# Patient Record
Sex: MALE | URBAN - METROPOLITAN AREA
[De-identification: names, ages, dates, MRNs, and addresses within clinical notes are randomized per-mention and may not be internally consistent; named-entity substitution may affect disease eponyms.]

---

## 2019-01-10 ENCOUNTER — APPOINTMENT (RX ONLY)
Dept: URBAN - METROPOLITAN AREA CLINIC 139 | Facility: CLINIC | Age: 69
Setting detail: DERMATOLOGY
End: 2019-01-10

## 2019-01-10 DIAGNOSIS — L03.01 CELLULITIS OF FINGER: ICD-10-CM

## 2019-01-10 PROBLEM — L03.011 CELLULITIS OF RIGHT FINGER: Status: ACTIVE | Noted: 2019-01-10

## 2019-01-10 PROBLEM — M12.9 ARTHROPATHY, UNSPECIFIED: Status: ACTIVE | Noted: 2019-01-10

## 2019-01-10 PROBLEM — L03.012 CELLULITIS OF LEFT FINGER: Status: ACTIVE | Noted: 2019-01-10

## 2019-01-10 PROCEDURE — ? ORDER TESTS

## 2019-01-10 PROCEDURE — ? COUNSELING

## 2019-01-10 PROCEDURE — ? OTHER

## 2019-01-10 PROCEDURE — 99202 OFFICE O/P NEW SF 15 MIN: CPT

## 2019-01-10 ASSESSMENT — LOCATION SIMPLE DESCRIPTION DERM
LOCATION SIMPLE: LEFT SMALL FINGER
LOCATION SIMPLE: RIGHT MIDDLE FINGERNAIL
LOCATION SIMPLE: LEFT RING FINGER
LOCATION SIMPLE: LEFT INDEX FINGERNAIL
LOCATION SIMPLE: RIGHT RING FINGERNAIL
LOCATION SIMPLE: RIGHT INDEX FINGERNAIL
LOCATION SIMPLE: LEFT MIDDLE FINGER
LOCATION SIMPLE: RIGHT SMALL FINGER
LOCATION SIMPLE: RIGHT THUMB

## 2019-01-10 ASSESSMENT — LOCATION DETAILED DESCRIPTION DERM
LOCATION DETAILED: PERIUNGUAL SKIN LEFT MIDDLE FINGER
LOCATION DETAILED: RIGHT INDEX FINGER LUNULA
LOCATION DETAILED: LEFT INDEX FINGER LUNULA
LOCATION DETAILED: LEFT SMALL FINGERNAIL
LOCATION DETAILED: RIGHT RING FINGER LUNULA
LOCATION DETAILED: RIGHT SMALL FINGERNAIL
LOCATION DETAILED: RIGHT MIDDLE FINGER LUNULA
LOCATION DETAILED: LEFT RING FINGERNAIL
LOCATION DETAILED: PERIUNGUAL SKIN RIGHT THUMB

## 2019-01-10 ASSESSMENT — LOCATION ZONE DERM
LOCATION ZONE: FINGER
LOCATION ZONE: FINGERNAIL

## 2019-01-10 NOTE — PROCEDURE: OTHER
Detail Level: Simple
Note Text (......Xxx Chief Complaint.): This diagnosis correlates with the
Other (Free Text): Patient will bring in the right thumbnail when it falls off as it is close to falling off.  He was given a specimen cup and we will transfer it to biopsy cup to send to lab.  It was discussed that Dr. Nash will do some research on his condition to find the correct treatment course and we will be in contact with him once a course of action is figured out.  Patient is in agreement with this plan.

## 2019-01-10 NOTE — HPI: NAIL DYSTROPHY
How Severe Is It?: moderate
Is This A New Presentation, Or A Follow-Up?: Nail Dystrophy
Additional History: Patient states that he noticed a few months ago his nails stopped growing and became shorter.  Then a few weeks later he noticed a green color under nails and a darker pigmented colors around the cuticles.  He started having tenderness after a month or two around the cuticle and they became red and slightly irritated.  He saw his PCP and they prescribed the Ciclopirox, which he states is not really helping.  He has tried OTC anti fungal creams, but due to current medications and rheumatoid arthritis he will not take any oral anti fungals.  He does have noticeable swelling of the finger joints with redness due to his rheumatoid arthritis.

## 2019-01-10 NOTE — PROCEDURE: COUNSELING
Detail Level: Detailed
Patient Specific Counseling (Will Not Stick From Patient To Patient): Patient advised that chronic paronychia can be caused by bacteria, yeast and even fungi or combination of these.  Culture was performed to hopefully better define causative agent(s).  Patient to use dilute bleach solution or hydrogen peroxide under nails and then apply antibiotic ointment to nail folds and under nails as tolerated.  Will give more specific medications with culture results.

## 2019-01-16 ENCOUNTER — RX ONLY (OUTPATIENT)
Age: 69
Setting detail: RX ONLY
End: 2019-01-16

## 2019-01-16 RX ORDER — CLOTRIMAZOLE/BETAMETHASONE DIP 1 %-0.05 %
CREAM (GRAM) TOPICAL
Qty: 1 | Refills: 0 | Status: ERX

## 2019-01-16 RX ORDER — DOXYCYCLINE HYCLATE 100 MG/1
CAPSULE, GELATIN COATED ORAL
Qty: 60 | Refills: 0 | Status: ERX

## 2019-02-05 ENCOUNTER — APPOINTMENT (RX ONLY)
Dept: URBAN - METROPOLITAN AREA CLINIC 139 | Facility: CLINIC | Age: 69
Setting detail: DERMATOLOGY
End: 2019-02-05

## 2019-02-05 DIAGNOSIS — L03.01 CELLULITIS OF FINGER: ICD-10-CM

## 2019-02-05 PROBLEM — L08.9 LOCAL INFECTION OF THE SKIN AND SUBCUTANEOUS TISSUE, UNSPECIFIED: Status: ACTIVE | Noted: 2019-02-05

## 2019-02-05 PROCEDURE — ? NAIL CLIPPING FOR PAS

## 2019-02-05 ASSESSMENT — LOCATION SIMPLE DESCRIPTION DERM: LOCATION SIMPLE: RIGHT THUMB

## 2019-02-05 ASSESSMENT — LOCATION DETAILED DESCRIPTION DERM: LOCATION DETAILED: PERIUNGUAL SKIN RIGHT THUMB

## 2019-02-05 ASSESSMENT — LOCATION ZONE DERM: LOCATION ZONE: FINGER

## 2019-02-05 NOTE — PROCEDURE: NAIL CLIPPING FOR PAS
Billing Type: Third-Party Bill
Add 15961 To Bill?: No
Detail Level: Detailed
Lab Facility: [id number]
Lab: [id number]

## 2019-02-20 ENCOUNTER — APPOINTMENT (RX ONLY)
Dept: URBAN - METROPOLITAN AREA CLINIC 139 | Facility: CLINIC | Age: 69
Setting detail: DERMATOLOGY
End: 2019-02-20

## 2019-02-20 DIAGNOSIS — L03.01 CELLULITIS OF FINGER: ICD-10-CM

## 2019-02-20 PROBLEM — L08.9 LOCAL INFECTION OF THE SKIN AND SUBCUTANEOUS TISSUE, UNSPECIFIED: Status: ACTIVE | Noted: 2019-02-20

## 2019-02-20 PROCEDURE — ? ORDER TESTS

## 2019-02-20 PROCEDURE — ? NAIL AVULSION

## 2019-02-20 PROCEDURE — 11730 AVULSION NAIL PLATE SIMPLE 1: CPT

## 2019-02-20 PROCEDURE — ? COUNSELING

## 2019-02-20 ASSESSMENT — LOCATION DETAILED DESCRIPTION DERM: LOCATION DETAILED: RIGHT SMALL FINGERNAIL

## 2019-02-20 ASSESSMENT — LOCATION ZONE DERM: LOCATION ZONE: FINGERNAIL

## 2019-02-20 ASSESSMENT — LOCATION SIMPLE DESCRIPTION DERM: LOCATION SIMPLE: RIGHT SMALL FINGER

## 2019-02-20 NOTE — PROCEDURE: NAIL AVULSION
Bill For Surgical Tray: no
Partial Avulsion Text: The nail was partially removed by undermining, removing the nail from the nail bed. The nail was subsequently avulsed.  Hemostasis was obtained with electrocautery.
Lab: [id number]
Avulsion Type: nail avulsion
Lab Facility: [id number]
Surgical Prep Text: The patient was placed in the supine position on the operating table in the surgery suite. The area was prepared and draped in the standard manner. Digital block(s) were obtained with a 50/50 mix of 2% lidocaine epinephrine and Marcaine. A total of 2.5 cc was used.
Consent: The rationale for nail avulsion was explained to the patient and consent was obtained. The risks, benefits and alternatives to therapy were discussed in detail. Specifically, the risks of infection, scarring, bleeding, prolonged wound healing, incomplete removal, allergy to anesthesia, nerve injury and recurrence were addressed. Prior to the procedure, the treatment site was clearly identified and confirmed by the patient. All components of Universal Protocol/PAUSE Rule completed.
Nail Avulsion Text: The nail was removed by undermining, removing the nail from the nail bed. The nail was subsequently avulsed.  There was no present bleeding.  The wound was then covered with Vaseline and a Telfa pad.  Then a pressure bandage was applied. Patient instructed to keep the bandage intact and dry for 1-2 days and after that he is to clean the area and reapply a bandage until it is somewhat healed or less tender.  Patient instructed to take Tylenol or Motrin as needed for pain and to elevate the wound as needed for pain.  He was given instructions to call our office if any problems arise.
Billing Type: Third-Party Bill
Nail And Matrix Avulsion Text: The nail was removed by undermining, removing the nail from the nail bed. The nail was subsequently avulsed. There was no bleeding present.
Detail Level: Detailed

## 2019-02-26 ENCOUNTER — RX ONLY (OUTPATIENT)
Age: 69
Setting detail: RX ONLY
End: 2019-02-26

## 2019-02-26 RX ORDER — TERBINAFINE HYDROCHLORIDE 250 MG/1
1 TABLET ORAL QD
Qty: 30 | Refills: 2 | Status: ERX

## 2019-03-11 ENCOUNTER — RX ONLY (OUTPATIENT)
Age: 69
Setting detail: RX ONLY
End: 2019-03-11

## 2019-03-11 RX ORDER — DOXYCYCLINE HYCLATE 100 MG/1
CAPSULE, GELATIN COATED ORAL
Qty: 30 | Refills: 0 | Status: ERX

## 2019-03-26 ENCOUNTER — APPOINTMENT (RX ONLY)
Dept: URBAN - METROPOLITAN AREA CLINIC 139 | Facility: CLINIC | Age: 69
Setting detail: DERMATOLOGY
End: 2019-03-26

## 2019-03-26 DIAGNOSIS — L60.3 NAIL DYSTROPHY: ICD-10-CM

## 2019-03-26 DIAGNOSIS — L03.01 CELLULITIS OF FINGER: ICD-10-CM

## 2019-03-26 DIAGNOSIS — B35.1 TINEA UNGUIUM: ICD-10-CM | Status: WORSENING

## 2019-03-26 PROBLEM — L03.019 CELLULITIS OF UNSPECIFIED FINGER: Status: ACTIVE | Noted: 2019-03-26

## 2019-03-26 PROCEDURE — ? TREATMENT REGIMEN

## 2019-03-26 PROCEDURE — ? COUNSELING

## 2019-03-26 PROCEDURE — ? OTHER

## 2019-03-26 PROCEDURE — 99213 OFFICE O/P EST LOW 20 MIN: CPT

## 2019-03-26 ASSESSMENT — LOCATION SIMPLE DESCRIPTION DERM
LOCATION SIMPLE: RIGHT MIDDLE FINGER
LOCATION SIMPLE: LEFT MIDDLE FINGER

## 2019-03-26 ASSESSMENT — LOCATION DETAILED DESCRIPTION DERM
LOCATION DETAILED: LEFT MIDDLE FINGERNAIL
LOCATION DETAILED: RIGHT MIDDLE FINGERNAIL

## 2019-03-26 ASSESSMENT — LOCATION ZONE DERM: LOCATION ZONE: FINGERNAIL

## 2019-03-26 NOTE — PROCEDURE: OTHER
Other (Free Text): Reasons were discussed with patient has to what can cause the issues with his nails such as chemicals (copper sulfate, Derifil and sodium propionate) or psoriasis.  Bacteria and fungus is also a possible option.  \\n Patient also advised that inflammation underlying the DIP Joint could possibly cause enough inflammation of the overlying proximal nail folds to allow the cuticle to become incompetent and allow penetration of both fungi and bacteria under the folds with subsequent discoloration, as well as affect the matrix just due to the inflammation and cause a dystrophic nail plate.  Patient is awaiting results from the arthritis work up he had done at [facility] last week.
Note Text (......Xxx Chief Complaint.): This diagnosis correlates with the
Detail Level: Simple

## 2019-03-26 NOTE — PROCEDURE: TREATMENT REGIMEN
Initiate Treatment: Bleach soaks to help kill bacteria and fungi. Use one teaspoon bleach per quart of water.  Soak twice a day for 5-10 minutes.  Dilute further if irritation.
Plan: Patient is to follow up with his work up for arthritis and inform our office once he receives a diagnosis.
Continue Regimen: Doxycycline 100 mg BID, Terbinafine 250mg, and  Lotrisone cream
Detail Level: Zone

## 2019-03-26 NOTE — PROCEDURE: COUNSELING
Detail Level: Zone
Detail Level: Detailed
Patient Specific Counseling (Will Not Stick From Patient To Patient): Patient also advised that the changes in the nail could actually be secondary to Psoriasis or Inflammation of the Nail folds and Matrix due to underlying arthritis.  Arthritis work-up by Rheumatology is in progress.

## 2019-05-21 ENCOUNTER — APPOINTMENT (RX ONLY)
Dept: URBAN - METROPOLITAN AREA CLINIC 139 | Facility: CLINIC | Age: 69
Setting detail: DERMATOLOGY
End: 2019-05-21

## 2019-05-21 DIAGNOSIS — B35.1 TINEA UNGUIUM: ICD-10-CM

## 2019-05-21 DIAGNOSIS — L60.3 NAIL DYSTROPHY: ICD-10-CM

## 2019-05-21 DIAGNOSIS — L03.01 CELLULITIS OF FINGER: ICD-10-CM

## 2019-05-21 PROBLEM — L08.9 LOCAL INFECTION OF THE SKIN AND SUBCUTANEOUS TISSUE, UNSPECIFIED: Status: ACTIVE | Noted: 2019-05-21

## 2019-05-21 PROCEDURE — ? TREATMENT REGIMEN

## 2019-05-21 PROCEDURE — ? COUNSELING

## 2019-05-21 PROCEDURE — 99213 OFFICE O/P EST LOW 20 MIN: CPT

## 2019-05-21 ASSESSMENT — LOCATION DETAILED DESCRIPTION DERM
LOCATION DETAILED: PERIUNGUAL SKIN RIGHT MIDDLE FINGER
LOCATION DETAILED: PERIUNGUAL SKIN RIGHT RING FINGER
LOCATION DETAILED: PERIUNGUAL SKIN RIGHT INDEX FINGER
LOCATION DETAILED: PERIUNGUAL SKIN LEFT INDEX FINGER
LOCATION DETAILED: PERIUNGUAL SKIN LEFT RING FINGER
LOCATION DETAILED: PERIUNGUAL SKIN LEFT SMALL FINGER

## 2019-05-21 ASSESSMENT — LOCATION ZONE DERM: LOCATION ZONE: FINGER

## 2019-05-21 ASSESSMENT — LOCATION SIMPLE DESCRIPTION DERM
LOCATION SIMPLE: LEFT SMALL FINGER
LOCATION SIMPLE: LEFT INDEX FINGER
LOCATION SIMPLE: RIGHT INDEX FINGER
LOCATION SIMPLE: LEFT RING FINGER
LOCATION SIMPLE: RIGHT RING FINGER
LOCATION SIMPLE: RIGHT MIDDLE FINGER

## 2019-05-21 NOTE — PROCEDURE: TREATMENT REGIMEN
Plan: Will have pt fill out JOSÉ to obtained lab results from [facility].  Patient was prescribed oral Terbinafine but was advised not to take it due to interaction with his DMARD medication prescribed by his Rheumatologist.  He is supposed to be using topicals for now but compliance is doubtful.
Detail Level: Zone

## 2019-05-21 NOTE — PROCEDURE: COUNSELING
Detail Level: Detailed
Patient Specific Counseling (Will Not Stick From Patient To Patient): Patient also advised that the changes in the nail could actually be secondary to Psoriasis or Inflammation of the Nail folds and Matrix due to underlying arthritis.  Arthritis work-up by Rheumatology is in progress.  I will call his Rheumatologist and discuss my findings and suggest discontinuation of the oral DMARD he is presently taking and recommend changing to Humira for both RA and Psoriasis and then institute oral Lamisil as indicated for his nails.
Detail Level: Simple

## 2019-06-25 ENCOUNTER — APPOINTMENT (RX ONLY)
Dept: URBAN - METROPOLITAN AREA CLINIC 139 | Facility: CLINIC | Age: 69
Setting detail: DERMATOLOGY
End: 2019-06-25

## 2019-06-25 DIAGNOSIS — B35.1 TINEA UNGUIUM: ICD-10-CM | Status: UNCHANGED

## 2019-06-25 DIAGNOSIS — F17.200 NICOTINE DEPENDENCE, UNSPECIFIED, UNCOMPLICATED: ICD-10-CM

## 2019-06-25 DIAGNOSIS — L03.01 CELLULITIS OF FINGER: ICD-10-CM

## 2019-06-25 DIAGNOSIS — L60.3 NAIL DYSTROPHY: ICD-10-CM | Status: UNCHANGED

## 2019-06-25 PROBLEM — L03.019 CELLULITIS OF UNSPECIFIED FINGER: Status: ACTIVE | Noted: 2019-06-25

## 2019-06-25 PROCEDURE — ? TREATMENT REGIMEN

## 2019-06-25 PROCEDURE — ? ORDER TESTS

## 2019-06-25 PROCEDURE — 99213 OFFICE O/P EST LOW 20 MIN: CPT

## 2019-06-25 PROCEDURE — ? COUNSELING

## 2019-06-25 ASSESSMENT — LOCATION ZONE DERM: LOCATION ZONE: FINGERNAIL

## 2019-06-25 ASSESSMENT — LOCATION DETAILED DESCRIPTION DERM
LOCATION DETAILED: RIGHT MIDDLE FINGERNAIL
LOCATION DETAILED: LEFT MIDDLE FINGERNAIL

## 2019-06-25 ASSESSMENT — LOCATION SIMPLE DESCRIPTION DERM
LOCATION SIMPLE: RIGHT MIDDLE FINGER
LOCATION SIMPLE: LEFT MIDDLE FINGER

## 2019-06-25 NOTE — PROCEDURE: COUNSELING
Detail Level: Simple
Detail Level: Detailed
Patient Specific Counseling (Will Not Stick From Patient To Patient): Paronychia involving all finger nails, thought to be due to underlying inflammation of the joints with Reactive Arthritis vs Nail Psoriasis and Psoriatic arthritis.  Patient medication changes just began one week ago, so no significant changes noticed at this time.  The previous pathology of a nail clipping showing Onychomycosis may represent contamination, since the involvement is proximal, rather than distal.  Will repeat clippings prior to institution of Terbinafine.  Will follow up with patient in 3-4 months, and if still no improvement, will consider oral antifungal.  Patient voices understanding and agrees with plan.
Quality 226: Preventive Care And Screening: Tobacco Use: Screening And Cessation Intervention: Patient screened for tobacco and is a smoker AND received Cessation Counseling

## 2019-06-25 NOTE — PROCEDURE: ORDER TESTS
Bill For Surgical Tray: no
Performing Laboratory: [id number]
Billing Type: Third-Party Bill
Expected Date Of Service: 06/25/2019

## 2019-06-25 NOTE — PROCEDURE: MIPS QUALITY
Quality 111:Pneumonia Vaccination Status For Older Adults: Pneumococcal Vaccination Previously Received
Quality 131: Pain Assessment And Follow-Up: Pain assessment using a standardized tool is documented as negative, no follow-up plan required
Detail Level: Detailed
Quality 130: Documentation Of Current Medications In The Medical Record: Current Medications Documented
Quality 110: Preventive Care And Screening: Influenza Immunization: Influenza Immunization Administered during Influenza season
Quality 474: Zoster Vaccination Status: Shingrix Vaccination Administered or Previously Received

## 2019-07-02 ENCOUNTER — RX ONLY (OUTPATIENT)
Age: 69
Setting detail: RX ONLY
End: 2019-07-02

## 2019-07-02 RX ORDER — DOXYCYCLINE HYCLATE 100 MG/1
CAPSULE, GELATIN COATED ORAL
Qty: 28 | Refills: 0 | Status: ERX

## 2019-09-25 ENCOUNTER — APPOINTMENT (RX ONLY)
Dept: URBAN - METROPOLITAN AREA CLINIC 139 | Facility: CLINIC | Age: 69
Setting detail: DERMATOLOGY
End: 2019-09-25

## 2019-09-25 DIAGNOSIS — L03.01 CELLULITIS OF FINGER: ICD-10-CM | Status: WORSENING

## 2019-09-25 DIAGNOSIS — F17.200 NICOTINE DEPENDENCE, UNSPECIFIED, UNCOMPLICATED: ICD-10-CM

## 2019-09-25 DIAGNOSIS — L60.3 NAIL DYSTROPHY: ICD-10-CM | Status: WORSENING

## 2019-09-25 DIAGNOSIS — B35.1 TINEA UNGUIUM: ICD-10-CM | Status: WORSENING

## 2019-09-25 PROBLEM — L08.9 LOCAL INFECTION OF THE SKIN AND SUBCUTANEOUS TISSUE, UNSPECIFIED: Status: ACTIVE | Noted: 2019-09-25

## 2019-09-25 PROBLEM — L60.9 NAIL DISORDER, UNSPECIFIED: Status: ACTIVE | Noted: 2019-09-25

## 2019-09-25 PROCEDURE — 99213 OFFICE O/P EST LOW 20 MIN: CPT

## 2019-09-25 PROCEDURE — ? COUNSELING

## 2019-09-25 ASSESSMENT — LOCATION ZONE DERM: LOCATION ZONE: FINGERNAIL

## 2019-09-25 ASSESSMENT — LOCATION SIMPLE DESCRIPTION DERM
LOCATION SIMPLE: RIGHT MIDDLE FINGER
LOCATION SIMPLE: LEFT MIDDLE FINGER

## 2019-09-25 ASSESSMENT — LOCATION DETAILED DESCRIPTION DERM
LOCATION DETAILED: LEFT MIDDLE FINGERNAIL
LOCATION DETAILED: RIGHT MIDDLE FINGERNAIL

## 2019-09-25 NOTE — PROCEDURE: COUNSELING
Patient Specific Counseling (Will Not Stick From Patient To Patient): Paronychia involving all finger nails, thought to be due to underlying inflammation of the joints with Reactive Arthritis vs Nail Psoriasis and Psoriatic arthritis.  Patient medication changes just began one week ago, so no significant changes noticed at this time.  The previous pathology of a nail clipping showing Onychomycosis may represent contamination, since the involvement is proximal, rather than distal.  Will repeat clippings prior to institution of Terbinafine.  Will follow up with patient in 3-4 months, and if still no improvement, will consider oral antifungal, although caution due to patient taking Arava, DMARD, which may reduce Terbinafine levels due to increased hepatic metabolism.  Patient voices understanding and agrees with plan.\\n\\nI have requested notes from his latest visit with Dr. Maya, Rheumatologist at [facility].  After review, I will call her and ask her to consider possibly changes from a TNF inhibitor to maybe Stelara or Cosentyx, both of which are approved for RA and PsA and Psoriasis.
Detail Level: Detailed
Detail Level: Simple
Quality 226: Preventive Care And Screening: Tobacco Use: Screening And Cessation Intervention: Patient screened for tobacco and is a smoker AND received Cessation Counseling

## 2019-09-25 NOTE — PROCEDURE: MIPS QUALITY
Quality 474: Zoster Vaccination Status: Shingrix Vaccination Administered or Previously Received
Quality 111:Pneumonia Vaccination Status For Older Adults: Pneumococcal Vaccination Previously Received
Quality 110: Preventive Care And Screening: Influenza Immunization: Influenza Immunization Administered during Influenza season
Quality 131: Pain Assessment And Follow-Up: Pain assessment using a standardized tool is documented as negative, no follow-up plan required
Quality 130: Documentation Of Current Medications In The Medical Record: Current Medications Documented
Detail Level: Detailed

## 2019-12-18 ENCOUNTER — APPOINTMENT (RX ONLY)
Dept: URBAN - METROPOLITAN AREA CLINIC 139 | Facility: CLINIC | Age: 69
Setting detail: DERMATOLOGY
End: 2019-12-18

## 2019-12-18 DIAGNOSIS — F17.200 NICOTINE DEPENDENCE, UNSPECIFIED, UNCOMPLICATED: ICD-10-CM

## 2019-12-18 DIAGNOSIS — L60.3 NAIL DYSTROPHY: ICD-10-CM | Status: WORSENING

## 2019-12-18 PROBLEM — L60.9 NAIL DISORDER, UNSPECIFIED: Status: ACTIVE | Noted: 2019-12-18

## 2019-12-18 PROCEDURE — ? OTHER

## 2019-12-18 PROCEDURE — 99213 OFFICE O/P EST LOW 20 MIN: CPT

## 2019-12-18 PROCEDURE — ? COUNSELING

## 2019-12-18 ASSESSMENT — LOCATION DETAILED DESCRIPTION DERM
LOCATION DETAILED: LEFT RING FINGERNAIL
LOCATION DETAILED: RIGHT MIDDLE FINGERNAIL
LOCATION DETAILED: LEFT THUMBNAIL
LOCATION DETAILED: LEFT SMALL FINGERNAIL
LOCATION DETAILED: RIGHT SMALL FINGERNAIL
LOCATION DETAILED: RIGHT RING FINGERNAIL
LOCATION DETAILED: RIGHT INDEX FINGERNAIL
LOCATION DETAILED: LEFT INDEX FINGERNAIL
LOCATION DETAILED: LEFT MIDDLE FINGERNAIL
LOCATION DETAILED: RIGHT THUMBNAIL

## 2019-12-18 ASSESSMENT — LOCATION SIMPLE DESCRIPTION DERM
LOCATION SIMPLE: RIGHT RING FINGER
LOCATION SIMPLE: LEFT THUMB
LOCATION SIMPLE: RIGHT INDEX FINGER
LOCATION SIMPLE: LEFT INDEX FINGER
LOCATION SIMPLE: LEFT SMALL FINGER
LOCATION SIMPLE: RIGHT SMALL FINGER
LOCATION SIMPLE: LEFT RING FINGER
LOCATION SIMPLE: LEFT MIDDLE FINGER
LOCATION SIMPLE: RIGHT MIDDLE FINGER
LOCATION SIMPLE: RIGHT HAND

## 2019-12-18 ASSESSMENT — LOCATION ZONE DERM: LOCATION ZONE: FINGERNAIL

## 2019-12-18 NOTE — PROCEDURE: OTHER
Other (Free Text): Pt has an appointment in April at [facility] to obtain a second opinion on ongoing nail condition
Detail Level: Simple
Note Text (......Xxx Chief Complaint.): This diagnosis correlates with the

## 2019-12-18 NOTE — PROCEDURE: MIPS QUALITY
Quality 110: Preventive Care And Screening: Influenza Immunization: Influenza Immunization Administered during Influenza season
Quality 474: Zoster Vaccination Status: Shingrix Vaccination Administered or Previously Received
Detail Level: Detailed
Quality 111:Pneumonia Vaccination Status For Older Adults: Pneumococcal Vaccination Administered
Quality 131: Pain Assessment And Follow-Up: Pain assessment using a standardized tool is documented as negative, no follow-up plan required

## 2019-12-18 NOTE — PROCEDURE: COUNSELING
Quality 226: Preventive Care And Screening: Tobacco Use: Screening And Cessation Intervention: Patient screened for tobacco and is a smoker AND received Cessation Counseling
Detail Level: Detailed
Patient Specific Counseling (Will Not Stick From Patient To Patient): Things we know:\\n\\n1.  Patient has severe nail dystrophy and chronic paronychia m/b erythema, edema, and tenderness without exudate of all digits of the hands with associated swelling and tenderness of the left wrist, and several MCP and PIP joints on exam with duration greater than 1 year by history.\\n2.  Culture from the paronychium and right thumb nail on 1/10/19 showed abundant mixed microbial population, no further workup.\\n3.  Culture from right 5th nail plate on 2/20/19 showed Fusarium species, a filamentous fungi, and two gram negative bacteria, Klebsiella oxytoca and Serratia marcescens.  Both sensitive to, and treated with, Doxycyline twice with no improvement.  \\n4.  Histopathology from nail avulsion of the right 5th digit on 2/23/19 showed hyphal elements within the cornified layer on PAS with diagnosis of Onychomycosis.  Microscopic description:  There are fragments of nail and/or nailbed stratum corneum.  Within these fragments fungal cells and/or hyphae are seen.\\n5.  Histopathology of the periungual skin and nail plate of the right thumb on 2/7/19 showed no fungal organisms on PAS with diagnosis of Unremarkable Nail Plate.  \\n6.  Radiographs of the right hand x 3 on 8/10/18 with impression:  Progressive erosive arthropathy of the right hand which as previously discussed remains most consistent with inflammatory arthropathy such as seronegative rheumatoid arthritis or seronegative spondyloarthropathy.\\n7.  Ultrasound of the left hand on 3/19/19 showed 1. synovial hypertrophy involving the proximal MCPs and left distal ulna associated with erosions and hyperemia.  These findings support rheumatoid arthritis.  2. No evidence of double contour sign or intraarticular calcifications to suggest coexisting crystalline arthropathy.\\n8.  History of bad dentition, presently in the process of correction and recently acknowledged to me a history of chronic diarrhea with weight loss, also presently being worked up but I have no information of results.  \\n\\nQuestions:\\n\\n1.   Is his condition a Reactive Arthritis/Nereida's which is a seronegative spondyloarthropathy often due to a previous, or possibly an on going, infection secondary to his dental status or chronic bowel condition with history of chronic diarrhea or Psoriatic arthritis, also a seronegative spondyloarthropathy both of which can cause similar nail changes involving the paronychia?  Also included would be an Enteropathic arthritis, an extraintestinal manifestation of IBD.  Although seronegative RA is certainly in the differential, but neither of these would not explain the nail changes.\\n\\n2. Was the positive PAS in the nail histology an actual infection of the nail with a dermatophyte or invasion by a saprophytic fungus, Fusarium sp., that was cultured from one of his nails?\\n\\n3.  Why has he not responded to treatment with DMARD Leflunomide and Cimzia, Certolizumab pegol, a TNF Inhibitor.  RA, PsA, and Psoriasis should all have some response to treatment as well as IBD?  His arthritis is somewhat better on Leflunomide, but his nail changes are unchanged or worsened.  Could this be due to either chronic immune system stimulation due to chronic infection(s) as stated above or truly a coincidental concomitant onychomycosis.\\n\\nThings to do:\\n\\n1.  Follow up on dental status and GI findings; including cultures, exams, procedures and studies.\\n2.  Consult with Dermatopathologist reference nail histology and possibly submit another sample.\\n3.  Consult with several other dermatologist and dermatopathologist within my organization.  And attempt to expedite second opinion by [facility] to allow for better flow of information within the same system.  \\n4.  Since this visit was just prior to the Christmas break, will have patient return for a more thorough ROS and Physical exam with possible collection of specimens for culture and histopathology, as well as, to discuss above findings.   \\n

## 2020-01-22 ENCOUNTER — APPOINTMENT (RX ONLY)
Dept: URBAN - METROPOLITAN AREA CLINIC 139 | Facility: CLINIC | Age: 70
Setting detail: DERMATOLOGY
End: 2020-01-22

## 2020-01-22 DIAGNOSIS — L30.9 DERMATITIS, UNSPECIFIED: ICD-10-CM

## 2020-01-22 DIAGNOSIS — L60.9 NAIL DISORDER, UNSPECIFIED: ICD-10-CM | Status: WORSENING

## 2020-01-22 DIAGNOSIS — L30.4 ERYTHEMA INTERTRIGO: ICD-10-CM

## 2020-01-22 DIAGNOSIS — F17.200 NICOTINE DEPENDENCE, UNSPECIFIED, UNCOMPLICATED: ICD-10-CM

## 2020-01-22 PROCEDURE — ? PRESCRIPTION

## 2020-01-22 PROCEDURE — ? COUNSELING

## 2020-01-22 PROCEDURE — 99213 OFFICE O/P EST LOW 20 MIN: CPT

## 2020-01-22 PROCEDURE — ? TREATMENT REGIMEN

## 2020-01-22 PROCEDURE — ? OTHER

## 2020-01-22 RX ORDER — UREA 40 G/100G
1 LOTION TOPICAL QHS
Qty: 1 | Refills: 2 | Status: ERX | COMMUNITY
Start: 2020-01-22

## 2020-01-22 RX ADMIN — UREA 1: 40 LOTION TOPICAL at 00:00

## 2020-01-22 ASSESSMENT — LOCATION SIMPLE DESCRIPTION DERM
LOCATION SIMPLE: RIGHT UPPER BACK
LOCATION SIMPLE: CHEST
LOCATION SIMPLE: LEFT 4TH TOE
LOCATION SIMPLE: RIGHT 4TH TOE

## 2020-01-22 ASSESSMENT — LOCATION DETAILED DESCRIPTION DERM
LOCATION DETAILED: LEFT MEDIAL 4TH TOE
LOCATION DETAILED: STERNUM
LOCATION DETAILED: RIGHT MEDIAL UPPER BACK
LOCATION DETAILED: RIGHT DORSAL 4TH TOE

## 2020-01-22 ASSESSMENT — LOCATION ZONE DERM
LOCATION ZONE: TRUNK
LOCATION ZONE: TOE

## 2020-01-22 ASSESSMENT — PAIN INTENSITY VAS: HOW INTENSE IS YOUR PAIN 0 BEING NO PAIN, 10 BEING THE MOST SEVERE PAIN POSSIBLE?: NO PAIN

## 2020-01-22 NOTE — HPI: NAIL DYSTROPHY
How Severe Is It?: severe
Is This A New Presentation, Or A Follow-Up?: Follow Up Nail Dystrophy
Additional History: Patient has recently seen [facility] for a second opinion.  Patient presents today to discuss

## 2020-01-22 NOTE — PROCEDURE: OTHER
Note Text (......Xxx Chief Complaint.): This diagnosis correlates with the
Detail Level: Simple
Other (Free Text): Will send an order for an abdominal ultrasound and chest x ray to [facility] referral department

## 2020-01-22 NOTE — PROCEDURE: TREATMENT REGIMEN
Detail Level: Zone
Otc Regimen: Recommended OTC daily application of antifungal spray
Otc Regimen: Recommended OTC sarna anti itch lotion
Continue Regimen: Triamcinolone cream as directed by providing physician
Otc Regimen: Patient was instructed to talk with the pharmacist if the prescription for urea 40 is unavailable for an OTC substitute such as the 10 or 20%

## 2022-06-21 NOTE — PROCEDURE: TREATMENT REGIMEN
DISPLAY PLAN FREE TEXT
Plan: Patient taking a cup home to collect a nail once it either falls off or it can be clipped, then he will bring it in for us to send off for biopsy
Detail Level: Zone